# Patient Record
Sex: FEMALE | URBAN - METROPOLITAN AREA
[De-identification: names, ages, dates, MRNs, and addresses within clinical notes are randomized per-mention and may not be internally consistent; named-entity substitution may affect disease eponyms.]

---

## 2022-12-05 ENCOUNTER — HOSPITAL ENCOUNTER (OUTPATIENT)
Dept: TELEMEDICINE | Facility: HOSPITAL | Age: 77
Discharge: HOME | End: 2022-12-05

## 2022-12-05 DIAGNOSIS — R55 SYNCOPE, UNSPECIFIED SYNCOPE TYPE: ICD-10-CM

## 2022-12-05 PROCEDURE — G0508 CRIT CARE TELEHEA CONSULT 60: HCPCS | Mod: GT,,, | Performed by: PSYCHIATRY & NEUROLOGY

## 2022-12-05 PROCEDURE — G0508 PR CRITICAL CARE TELEHLTH INITIAL CONSULT 60MIN: ICD-10-PCS | Mod: GT,,, | Performed by: PSYCHIATRY & NEUROLOGY

## 2022-12-06 NOTE — HPI
78 y/o WF with a history of dementia with a syncopal spell tonight around 6:40p.  There may have been post-ictal L hemiparesis that has improved.  Per , she is back to baseline.  She had a syncopal spell last week as well.

## 2022-12-06 NOTE — CONSULTS
[facility]  Vascular Neurology  Comprehensive Stroke Center  TeleVascular Neurology Acute Consultation Note      Consults    Consulting Provider: RUTHANN SANDHU  Current Providers  No providers found    Patient Location: [facility] Emergency Department  Spoke hospital nurse at bedside with patient assisting consultant.     Patient information was obtained from spouse/SO and EMS personnel.         Assessment/Plan:       Diagnoses:   Syncope  Syncope with underlying dementia.  Cardiogenic and generalized, non-convulsive seizures in DDx.  Low index of suspicion for ischemic stroke.        STROKE DOCUMENTATION     Acute Stroke Times:   Acute Stroke Times   Last Known Normal Date: 12/05/22  Last Known Normal Time: 1840  Stroke Team Arrival Time: 2129  CT Interpretation Time: 2141    NIH Scale:  Interval: baseline  1a. Level of Consciousness: 0-->Alert, keenly responsive  1b. LOC Questions: 2-->Answers neither question correctly  1c. LOC Commands: 0-->Performs both tasks correctly  2. Best Gaze: 0-->Normal  3. Visual: 0-->No visual loss  4. Facial Palsy: 0-->Normal symmetrical movements  5a. Motor Arm, Left: 0-->No drift, limb holds 90 (or 45) degrees for full 10 secs  5b. Motor Arm, Right: 0-->No drift, limb holds 90 (or 45) degrees for full 10 secs  6a. Motor Leg, Left: 2-->Some effort against gravity, leg falls to bed by 5 secs, but has some effort against gravity  6b. Motor Leg, Right: 3-->No effort against gravity, leg falls to bed immediately  7. Limb Ataxia: 0-->Absent  8. Sensory: 0-->Normal, no sensory loss  9. Best Language: 1-->Mild-to-moderate aphasia, some obvious loss of fluency or facility of comprehension, without significant limitation on ideas expressed or form of expression. Reduction of speech and/or comprehension, however, makes conversation. . . (see row details)  10. Dysarthria: 0-->Normal  11. Extinction and Inattention  (formerly Neglect): 0-->No abnormality  Total (NIH Stroke Scale): 8     Modified [place] Coma Scale:    ABCD2 Score:    [id number] Score:   HAS -BLED Score:   ICH Score:   Hunt & Parker Classification:       There were no vitals taken for this visit.  Eligible for thrombolytic therapy?: No  Thrombolytic therapy recomended: Thrombolytic therapy not recommended due to Suspected stroke mimic  and Patient back to neurological baseline  Possible Interventional Revascularization Candidate? No; at this time symptoms not suggestive of large vessel occlusion    Disposition Recommendation: admit to inpatient    Subjective:     History of Present Illness:  76 y/o WF with a history of dementia with a syncopal spell tonight around 6:40p.  There may have been post-ictal L hemiparesis that has improved.  Per , she is back to baseline.  She had a syncopal spell last week as well.      Woke up with symptoms?: no    Recent bleeding noted: no  Does the patient take any Blood Thinners? yes  Medications: ASA    Past Medical History: syncope; uti; pacemaker; cad; dementia    Past Surgical History: no major surgeries within the last 2 weeks    Family History: no relevant history    Social History: former smoker    Allergies: Allergies have not been reviewed     Review of Systems  Objective:   Vitals: There were no vitals taken for this visit. 141/65    CT READ: Yes  No hemmorhage. No mass effect. No early infarct signs.     Physical Exam  Vitals reviewed.             Recommended the emergency room physician to have a brief discussion with the patient and/or family if available regarding the  risks and benefits of treatment, and to briefly document the occurrence of that discussion in his clinical encounter note.     The attending portion of this evaluation, treatment, and documentation was performed per Jason Fisher MD via audiovisual.    Billing code:  (time dependent stroke, complex case, unstable patient,  hemorrhages, any intervention, some mimics)      Care was coordinated with other physicians involved in the patient's care.  Radiologic studies and laboratory data were reviewed and interpreted, and plan of care was re-assessed based on the results.  Diagnosis, treatment options and prognosis may have been discussed with the patient and/or family members or caregiver.  Further advanced medical management and further evaluation is warranted for her care.    In your opinion, this was a: Tier 1 Van Negative    Consult End Time: 9:52 PM     Jason Fisher MD  [facility]  Vascular Neurology   [facility]

## 2022-12-06 NOTE — SUBJECTIVE & OBJECTIVE
Woke up with symptoms?: no    Recent bleeding noted: no  Does the patient take any Blood Thinners? yes  Medications: ASA    Past Medical History: syncope; uti; pacemaker; cad; dementia    Past Surgical History: no major surgeries within the last 2 weeks    Family History: no relevant history    Social History: former smoker    Allergies: Allergies have not been reviewed     Review of Systems  Objective:   Vitals: There were no vitals taken for this visit. 141/65    CT READ: Yes  No hemmorhage. No mass effect. No early infarct signs.     Physical Exam  Vitals reviewed.